# Patient Record
Sex: MALE | Race: BLACK OR AFRICAN AMERICAN | NOT HISPANIC OR LATINO
[De-identification: names, ages, dates, MRNs, and addresses within clinical notes are randomized per-mention and may not be internally consistent; named-entity substitution may affect disease eponyms.]

---

## 2020-06-12 PROBLEM — Z00.00 ENCOUNTER FOR PREVENTIVE HEALTH EXAMINATION: Status: ACTIVE | Noted: 2020-06-12

## 2020-06-18 ENCOUNTER — APPOINTMENT (OUTPATIENT)
Dept: UROLOGY | Facility: CLINIC | Age: 60
End: 2020-06-18

## 2020-06-18 ENCOUNTER — APPOINTMENT (OUTPATIENT)
Dept: UROLOGY | Facility: CLINIC | Age: 60
End: 2020-06-18
Payer: SELF-PAY

## 2020-06-18 PROCEDURE — 99203 OFFICE O/P NEW LOW 30 MIN: CPT | Mod: 95

## 2020-06-18 NOTE — HISTORY OF PRESENT ILLNESS
[Verbal consent obtained from patient] : the patient, [unfilled] [Home] : at home, [unfilled] , at the time of the visit. [Medical Office: ([facility])___] : at the medical office located in  [FreeTextEntry1] : pt to be contacted via [employer] [phone]\par \par The patient-doctor relationship has been established in a face to face fashion via real time video/audio HIPAA compliant communication using telemedicine software.  The patient's identity has been confirmed.  The patient was previously emailed a copy of the telemedicine consent.  They have had a chance to review and has now given verbal consent and has requested care to be assessed and treated through telemedicine and understands there maybe limitations in this process and they may need further follow up care in the office and or hospital settings.   \par  \par Verbal consent given on 6/2018\par  \par The patient denies fevers, chills, nausea and or vomiting and no unexplained weight loss. \par  \par All pertinent parts of the patient PFSH (past medical, family and social histories), laboratory, radiological studies and physician notes were reviewed prior to starting the face to face portion of the telemedicine visit.  Questionnaire results were discussed with patient. \par  \par CC: Prostate cancer \par \par HPI: Patient with GS7 prostate cancer on TRUS biopsy with Dr. Marks.  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  He has met with radiation oncologist and is deciding between radiation and surgery.  He already has decided that if he elects radiation he will refuse ADT. \par \par Mild LUTS, some urgency after biopsy.  Normal erections are reported. \par \par He is 5 foot 6 inches, 220 lbs.  His only comorbidity is HTN\par \par \par FAMHX:  Denies CAP \par SURGHX:  ESWL \par SOCIAL:  Nonsmoker, , 4 children and 5 grandchildren\par ROS: Negative 10 system\par \par

## 2020-06-18 NOTE — ASSESSMENT
[FreeTextEntry1] : Today we discussed the natural history of localized prostate cancer, and the heterogeneous biology of this malignancy.  We discussed the fact that many prostate cancers are slow growing and unlikely to metastasize or cause death, whereas others can be life threatening.  We reviewed risk stratification, staging, [place] scoring, and PSA levels as they pertain to risk.  \par \par All treatment options were reviewed.  This included active surveillance, androgen deprivation, emerging options such as focal therapy/HIFU/cryotherapy, radiation options (including IMRT, SBRT, brachytherapy) and surgical options (open vs. robotic surgery, nerve vs. non-nerve sparing).  Oncologic outcomes were compared and contrasted.  Risks of biochemical and clinical recurrence discussed.  Risks of needing adjuvant or salvage treatments reviewed.  We discussed the risks of secondary malignancy after radiation.  We discussed the opportunity for pathological staging with surgery vs. other options.  We discussed the possibility of positive margins, treatment failure, cancer recurrence and cancer-related death even with treatment. \par \par All potential side effects of treatment were reviewed including, but not limited to: short term or permanent stress urinary incontinence and/or short term or permanent erectile dysfunction, penile shortening, rectal symptoms/pain, perineal pain, and other side effects. \par \par All potential complications of treatment and surgery were reviewed including, but not limited to: risks of conversion from MIS to open surgery discussed, bleeding//life-threatening hemorrhage, rectal injury requiring colostomy or delayed recognition leading to fistula, vascular/bowel/adjacent visceral organ injury, trocar/access injury, the possibility of recognized vs. unrecognized/delayed-recognition injury, risks of thermal/blunt/sharp/retraction injury, risks of DVT, PE, MI, death, risks of cardiopulmonary/anesthesia related complications, positional injury, infection/collection/abscess, wound complications/dehiscence/seroma/cellulitis, urinoma/fistula, ureteral injury/obstruction, bladder neck contracture, penile shortening, meatal stenosis, urethral stricture, lymphocele, obturator nerve injury, prolonged anastomotic leak, and other complications. \par \par \par \par \par \par \par \par

## 2020-07-28 ENCOUNTER — OUTPATIENT (OUTPATIENT)
Dept: OUTPATIENT SERVICES | Facility: HOSPITAL | Age: 60
LOS: 1 days | End: 2020-07-28
Payer: COMMERCIAL

## 2020-07-28 ENCOUNTER — APPOINTMENT (OUTPATIENT)
Dept: UROLOGY | Facility: CLINIC | Age: 60
End: 2020-07-28
Payer: COMMERCIAL

## 2020-07-28 PROCEDURE — 99214 OFFICE O/P EST MOD 30 MIN: CPT

## 2020-07-28 PROCEDURE — 71045 X-RAY EXAM CHEST 1 VIEW: CPT

## 2020-07-28 PROCEDURE — 71045 X-RAY EXAM CHEST 1 VIEW: CPT | Mod: 26

## 2020-07-28 RX ORDER — AMLODIPINE BESYLATE 5 MG/1
5 TABLET ORAL
Refills: 0 | Status: ACTIVE | COMMUNITY

## 2020-07-28 RX ORDER — ASPIRIN 81 MG
81 TABLET, DELAYED RELEASE (ENTERIC COATED) ORAL
Refills: 0 | Status: ACTIVE | COMMUNITY

## 2020-07-28 RX ORDER — MULTIVITAMIN
TABLET ORAL
Refills: 0 | Status: ACTIVE | COMMUNITY

## 2020-07-28 NOTE — HISTORY OF PRESENT ILLNESS
[FreeTextEntry1] : CC: prostate cancer\par \par Patient ready for surgery on this coming Monday\par We had a lengthy discussion today reviewing, again, the various treatment options and all related r/b/a and complications.  This included, but was not limited to, long term or permanent ED/ROCIO. \par \par We are ensuring slides are reviewed internally, and pending that we will be ready to proceed next week. \par \par All questions answered.

## 2020-08-03 ENCOUNTER — OUTPATIENT (OUTPATIENT)
Dept: OUTPATIENT SERVICES | Facility: HOSPITAL | Age: 60
LOS: 1 days | End: 2020-08-03
Payer: COMMERCIAL

## 2020-08-03 ENCOUNTER — RESULT REVIEW (OUTPATIENT)
Age: 60
End: 2020-08-03

## 2020-08-03 DIAGNOSIS — C61 MALIGNANT NEOPLASM OF PROSTATE: ICD-10-CM

## 2020-08-03 LAB — SURGICAL PATHOLOGY STUDY: SIGNIFICANT CHANGE UP

## 2020-08-03 PROCEDURE — 88321 CONSLTJ&REPRT SLD PREP ELSWR: CPT

## 2020-08-05 ENCOUNTER — TRANSCRIPTION ENCOUNTER (OUTPATIENT)
Age: 60
End: 2020-08-05

## 2020-08-05 VITALS
WEIGHT: 216.93 LBS | OXYGEN SATURATION: 98 % | RESPIRATION RATE: 16 BRPM | HEART RATE: 76 BPM | SYSTOLIC BLOOD PRESSURE: 100 MMHG | DIASTOLIC BLOOD PRESSURE: 72 MMHG | HEIGHT: 66 IN | TEMPERATURE: 97 F

## 2020-08-06 ENCOUNTER — INPATIENT (INPATIENT)
Facility: HOSPITAL | Age: 60
LOS: 0 days | Discharge: HOME | DRG: 708 | End: 2020-08-07
Attending: UROLOGY | Admitting: UROLOGY
Payer: COMMERCIAL

## 2020-08-06 ENCOUNTER — APPOINTMENT (OUTPATIENT)
Dept: UROLOGY | Facility: HOSPITAL | Age: 60
End: 2020-08-06

## 2020-08-06 ENCOUNTER — RESULT REVIEW (OUTPATIENT)
Age: 60
End: 2020-08-06

## 2020-08-06 DIAGNOSIS — Z41.9 ENCOUNTER FOR PROCEDURE FOR PURPOSES OTHER THAN REMEDYING HEALTH STATE, UNSPECIFIED: Chronic | ICD-10-CM

## 2020-08-06 DIAGNOSIS — C61 MALIGNANT NEOPLASM OF PROSTATE: ICD-10-CM

## 2020-08-06 LAB
ANION GAP SERPL CALC-SCNC: 10 MMOL/L — SIGNIFICANT CHANGE UP (ref 5–17)
BUN SERPL-MCNC: 19 MG/DL — SIGNIFICANT CHANGE UP (ref 7–23)
CALCIUM SERPL-MCNC: 9.4 MG/DL — SIGNIFICANT CHANGE UP (ref 8.4–10.5)
CHLORIDE SERPL-SCNC: 103 MMOL/L — SIGNIFICANT CHANGE UP (ref 96–108)
CO2 SERPL-SCNC: 25 MMOL/L — SIGNIFICANT CHANGE UP (ref 22–31)
CREAT SERPL-MCNC: 1.64 MG/DL — HIGH (ref 0.5–1.3)
GLUCOSE SERPL-MCNC: 121 MG/DL — HIGH (ref 70–99)
HCT VFR BLD CALC: 40.5 % — SIGNIFICANT CHANGE UP (ref 39–50)
HGB BLD-MCNC: 12.4 G/DL — LOW (ref 13–17)
MCHC RBC-ENTMCNC: 27.3 PG — SIGNIFICANT CHANGE UP (ref 27–34)
MCHC RBC-ENTMCNC: 30.6 GM/DL — LOW (ref 32–36)
MCV RBC AUTO: 89 FL — SIGNIFICANT CHANGE UP (ref 80–100)
NRBC # BLD: 0 /100 WBCS — SIGNIFICANT CHANGE UP (ref 0–0)
PLATELET # BLD AUTO: 239 K/UL — SIGNIFICANT CHANGE UP (ref 150–400)
POTASSIUM SERPL-MCNC: 3.7 MMOL/L — SIGNIFICANT CHANGE UP (ref 3.5–5.3)
POTASSIUM SERPL-SCNC: 3.7 MMOL/L — SIGNIFICANT CHANGE UP (ref 3.5–5.3)
RBC # BLD: 4.55 M/UL — SIGNIFICANT CHANGE UP (ref 4.2–5.8)
RBC # FLD: 14.5 % — SIGNIFICANT CHANGE UP (ref 10.3–14.5)
SARS-COV-2 N GENE NPH QL NAA+PROBE: NOT DETECTED
SODIUM SERPL-SCNC: 138 MMOL/L — SIGNIFICANT CHANGE UP (ref 135–145)
WBC # BLD: 11.7 K/UL — HIGH (ref 3.8–10.5)
WBC # FLD AUTO: 11.7 K/UL — HIGH (ref 3.8–10.5)

## 2020-08-06 PROCEDURE — 88307 TISSUE EXAM BY PATHOLOGIST: CPT | Mod: 26

## 2020-08-06 PROCEDURE — 88309 TISSUE EXAM BY PATHOLOGIST: CPT | Mod: 26

## 2020-08-06 PROCEDURE — 38571 LAPAROSCOPY LYMPHADENECTOMY: CPT

## 2020-08-06 PROCEDURE — 55866 LAPS SURG PRST8ECT RPBIC RAD: CPT

## 2020-08-06 RX ORDER — ACETAMINOPHEN 500 MG
650 TABLET ORAL EVERY 6 HOURS
Refills: 0 | Status: DISCONTINUED | OUTPATIENT
Start: 2020-08-06 | End: 2020-08-07

## 2020-08-06 RX ORDER — NEBIVOLOL HYDROCHLORIDE 5 MG/1
1 TABLET ORAL
Qty: 0 | Refills: 0 | DISCHARGE

## 2020-08-06 RX ORDER — ENOXAPARIN SODIUM 100 MG/ML
40 INJECTION SUBCUTANEOUS ONCE
Refills: 0 | Status: COMPLETED | OUTPATIENT
Start: 2020-08-06 | End: 2020-08-06

## 2020-08-06 RX ORDER — ASPIRIN/CALCIUM CARB/MAGNESIUM 324 MG
0 TABLET ORAL
Qty: 0 | Refills: 0 | DISCHARGE

## 2020-08-06 RX ORDER — MORPHINE SULFATE 50 MG/1
4 CAPSULE, EXTENDED RELEASE ORAL ONCE
Refills: 0 | Status: DISCONTINUED | OUTPATIENT
Start: 2020-08-06 | End: 2020-08-06

## 2020-08-06 RX ORDER — LIDOCAINE 4 G/100G
1 CREAM TOPICAL
Refills: 0 | Status: DISCONTINUED | OUTPATIENT
Start: 2020-08-06 | End: 2020-08-07

## 2020-08-06 RX ORDER — CEFAZOLIN SODIUM 1 G
2000 VIAL (EA) INJECTION EVERY 8 HOURS
Refills: 0 | Status: DISCONTINUED | OUTPATIENT
Start: 2020-08-06 | End: 2020-08-07

## 2020-08-06 RX ORDER — SENNA PLUS 8.6 MG/1
2 TABLET ORAL AT BEDTIME
Refills: 0 | Status: DISCONTINUED | OUTPATIENT
Start: 2020-08-06 | End: 2020-08-07

## 2020-08-06 RX ORDER — ONDANSETRON 8 MG/1
4 TABLET, FILM COATED ORAL EVERY 6 HOURS
Refills: 0 | Status: DISCONTINUED | OUTPATIENT
Start: 2020-08-06 | End: 2020-08-07

## 2020-08-06 RX ORDER — OXYBUTYNIN CHLORIDE 5 MG
5 TABLET ORAL THREE TIMES A DAY
Refills: 0 | Status: DISCONTINUED | OUTPATIENT
Start: 2020-08-06 | End: 2020-08-07

## 2020-08-06 RX ORDER — METOCLOPRAMIDE HCL 10 MG
10 TABLET ORAL EVERY 6 HOURS
Refills: 0 | Status: DISCONTINUED | OUTPATIENT
Start: 2020-08-06 | End: 2020-08-07

## 2020-08-06 RX ORDER — AMLODIPINE BESYLATE 2.5 MG/1
10 TABLET ORAL DAILY
Refills: 0 | Status: DISCONTINUED | OUTPATIENT
Start: 2020-08-06 | End: 2020-08-07

## 2020-08-06 RX ORDER — HYDROCHLOROTHIAZIDE 25 MG
25 TABLET ORAL DAILY
Refills: 0 | Status: DISCONTINUED | OUTPATIENT
Start: 2020-08-06 | End: 2020-08-07

## 2020-08-06 RX ORDER — BUPIVACAINE 13.3 MG/ML
20 INJECTION, SUSPENSION, LIPOSOMAL INFILTRATION ONCE
Refills: 0 | Status: DISCONTINUED | OUTPATIENT
Start: 2020-08-06 | End: 2020-08-07

## 2020-08-06 RX ORDER — SODIUM CHLORIDE 9 MG/ML
1000 INJECTION INTRAMUSCULAR; INTRAVENOUS; SUBCUTANEOUS
Refills: 0 | Status: DISCONTINUED | OUTPATIENT
Start: 2020-08-06 | End: 2020-08-07

## 2020-08-06 RX ORDER — OLMESARTAN MEDOXOMIL / AMLODIPINE BESYLATE / HYDROCHLOROTHIAZIDE 40; 10; 25 MG/1; MG/1; MG/1
1 TABLET, FILM COATED ORAL
Qty: 0 | Refills: 0 | DISCHARGE

## 2020-08-06 RX ORDER — SODIUM CHLORIDE 9 MG/ML
1000 INJECTION, SOLUTION INTRAVENOUS
Refills: 0 | Status: DISCONTINUED | OUTPATIENT
Start: 2020-08-06 | End: 2020-08-06

## 2020-08-06 RX ORDER — GABAPENTIN 400 MG/1
600 CAPSULE ORAL ONCE
Refills: 0 | Status: COMPLETED | OUTPATIENT
Start: 2020-08-06 | End: 2020-08-06

## 2020-08-06 RX ORDER — ACETAMINOPHEN 500 MG
1000 TABLET ORAL ONCE
Refills: 0 | Status: COMPLETED | OUTPATIENT
Start: 2020-08-06 | End: 2020-08-06

## 2020-08-06 RX ORDER — LOSARTAN POTASSIUM 100 MG/1
100 TABLET, FILM COATED ORAL DAILY
Refills: 0 | Status: DISCONTINUED | OUTPATIENT
Start: 2020-08-06 | End: 2020-08-07

## 2020-08-06 RX ADMIN — Medication 100 MILLIGRAM(S): at 21:19

## 2020-08-06 RX ADMIN — Medication 650 MILLIGRAM(S): at 20:39

## 2020-08-06 RX ADMIN — GABAPENTIN 600 MILLIGRAM(S): 400 CAPSULE ORAL at 11:41

## 2020-08-06 RX ADMIN — Medication 650 MILLIGRAM(S): at 19:48

## 2020-08-06 RX ADMIN — SODIUM CHLORIDE 125 MILLILITER(S): 9 INJECTION, SOLUTION INTRAVENOUS at 19:56

## 2020-08-06 RX ADMIN — Medication 650 MILLIGRAM(S): at 23:24

## 2020-08-06 RX ADMIN — Medication 1000 MILLIGRAM(S): at 11:41

## 2020-08-06 RX ADMIN — LIDOCAINE 1 APPLICATION(S): 4 CREAM TOPICAL at 21:20

## 2020-08-06 RX ADMIN — ENOXAPARIN SODIUM 40 MILLIGRAM(S): 100 INJECTION SUBCUTANEOUS at 11:41

## 2020-08-06 RX ADMIN — SENNA PLUS 2 TABLET(S): 8.6 TABLET ORAL at 21:20

## 2020-08-06 RX ADMIN — MORPHINE SULFATE 4 MILLIGRAM(S): 50 CAPSULE, EXTENDED RELEASE ORAL at 20:20

## 2020-08-06 RX ADMIN — MORPHINE SULFATE 4 MILLIGRAM(S): 50 CAPSULE, EXTENDED RELEASE ORAL at 20:39

## 2020-08-06 RX ADMIN — Medication 5 MILLIGRAM(S): at 19:48

## 2020-08-06 NOTE — PROGRESS NOTE ADULT - PROBLEM SELECTOR PLAN 1
-OOB  -Incentive regulo  -Monitor I's and O's  -Letty and ZACHARY  -SCD's  -f/u am labs  -continue present care

## 2020-08-06 NOTE — PROGRESS NOTE ADULT - SUBJECTIVE AND OBJECTIVE BOX
UROLOGY POST OP NOTE (PAGER # [phone])    PROCEDURE: RALP    T(C): 36.9 (08-06-20 @ 22:00), Max: 36.9 (08-06-20 @ 22:00)  HR: 89 (08-06-20 @ 22:00) (78 - 89)  BP: 120/82 (08-06-20 @ 22:00) (117/75 - 137/88)  RR: 17 (08-06-20 @ 22:00) (14 - 18)  SpO2: 99% (08-06-20 @ 22:00) (95% - 99%)  Wt(kg): --    Subjective: Pt states pain 10/10. Given Morphine 4mg IVP X 1 dose in PACU. pain controlled. Denies N, V, CP, SOB.  ON PE: awake and alert    Abdomen: nontender, nondistended     : no suprapubic/CVAT. FC intact. ZACHARY draining serosanguinous fluid.                          12.4   11.70 )-----------( 239      ( 06 Aug 2020 19:10 )             40.5     08-06    138  |  103  |  19  ----------------------------<  121<H>  3.7   |  25  |  1.64<H>    Ca    9.4      06 Aug 2020 19:10        A/P: UROLOGY POST OP NOTE (PAGER # [phone])    PROCEDURE: RALP    T(C): 36.9 (08-06-20 @ 22:00), Max: 36.9 (08-06-20 @ 22:00)  HR: 89 (08-06-20 @ 22:00) (78 - 89)  BP: 120/82 (08-06-20 @ 22:00) (117/75 - 137/88)  RR: 17 (08-06-20 @ 22:00) (14 - 18)  SpO2: 99% (08-06-20 @ 22:00) (95% - 99%)  Wt(kg): --    Subjective: Pt states pain 10/10. Given Morphine 4mg IVP X 1 dose in PACU. pain controlled. Denies N, V, CP, SOB.  ON PE: awake and alert    Abdomen: nontender, nondistended     : no suprapubic/CVAT. FC intact.                           12.4   11.70 )-----------( 239      ( 06 Aug 2020 19:10 )             40.5     08-06    138  |  103  |  19  ----------------------------<  121<H>  3.7   |  25  |  1.64<H>    Ca    9.4      06 Aug 2020 19:10        A/P:

## 2020-08-06 NOTE — BRIEF OPERATIVE NOTE - OPERATION/FINDINGS
1. Robotic assisted prostatectomy with bilateral pelvic lymph node dissection  2. Watertight anastomosis  Dictation: [id number]

## 2020-08-07 ENCOUNTER — TRANSCRIPTION ENCOUNTER (OUTPATIENT)
Age: 60
End: 2020-08-07

## 2020-08-07 VITALS
DIASTOLIC BLOOD PRESSURE: 71 MMHG | SYSTOLIC BLOOD PRESSURE: 111 MMHG | TEMPERATURE: 99 F | RESPIRATION RATE: 18 BRPM | HEART RATE: 92 BPM | OXYGEN SATURATION: 95 %

## 2020-08-07 LAB
ANION GAP SERPL CALC-SCNC: 12 MMOL/L — SIGNIFICANT CHANGE UP (ref 5–17)
BASOPHILS # BLD AUTO: 0 K/UL — SIGNIFICANT CHANGE UP (ref 0–0.2)
BASOPHILS NFR BLD AUTO: 0 % — SIGNIFICANT CHANGE UP (ref 0–2)
BUN SERPL-MCNC: 21 MG/DL — SIGNIFICANT CHANGE UP (ref 7–23)
CALCIUM SERPL-MCNC: 8.6 MG/DL — SIGNIFICANT CHANGE UP (ref 8.4–10.5)
CHLORIDE SERPL-SCNC: 102 MMOL/L — SIGNIFICANT CHANGE UP (ref 96–108)
CO2 SERPL-SCNC: 22 MMOL/L — SIGNIFICANT CHANGE UP (ref 22–31)
CREAT SERPL-MCNC: 1.28 MG/DL — SIGNIFICANT CHANGE UP (ref 0.5–1.3)
EOSINOPHIL # BLD AUTO: 0 K/UL — SIGNIFICANT CHANGE UP (ref 0–0.5)
EOSINOPHIL NFR BLD AUTO: 0 % — SIGNIFICANT CHANGE UP (ref 0–6)
GLUCOSE SERPL-MCNC: 97 MG/DL — SIGNIFICANT CHANGE UP (ref 70–99)
HCT VFR BLD CALC: 34.3 % — LOW (ref 39–50)
HGB BLD-MCNC: 11 G/DL — LOW (ref 13–17)
LYMPHOCYTES # BLD AUTO: 1.15 K/UL — SIGNIFICANT CHANGE UP (ref 1–3.3)
LYMPHOCYTES # BLD AUTO: 13.9 % — SIGNIFICANT CHANGE UP (ref 13–44)
MAGNESIUM SERPL-MCNC: 1.9 MG/DL — SIGNIFICANT CHANGE UP (ref 1.6–2.6)
MCHC RBC-ENTMCNC: 27.6 PG — SIGNIFICANT CHANGE UP (ref 27–34)
MCHC RBC-ENTMCNC: 32.1 GM/DL — SIGNIFICANT CHANGE UP (ref 32–36)
MCV RBC AUTO: 86.2 FL — SIGNIFICANT CHANGE UP (ref 80–100)
MONOCYTES # BLD AUTO: 1.01 K/UL — HIGH (ref 0–0.9)
MONOCYTES NFR BLD AUTO: 12.2 % — SIGNIFICANT CHANGE UP (ref 2–14)
NEUTROPHILS # BLD AUTO: 5.96 K/UL — SIGNIFICANT CHANGE UP (ref 1.8–7.4)
NEUTROPHILS NFR BLD AUTO: 71.3 % — SIGNIFICANT CHANGE UP (ref 43–77)
PHOSPHATE SERPL-MCNC: 4.3 MG/DL — SIGNIFICANT CHANGE UP (ref 2.5–4.5)
PLATELET # BLD AUTO: 201 K/UL — SIGNIFICANT CHANGE UP (ref 150–400)
POTASSIUM SERPL-MCNC: 3.7 MMOL/L — SIGNIFICANT CHANGE UP (ref 3.5–5.3)
POTASSIUM SERPL-SCNC: 3.7 MMOL/L — SIGNIFICANT CHANGE UP (ref 3.5–5.3)
RBC # BLD: 3.98 M/UL — LOW (ref 4.2–5.8)
RBC # FLD: 14.6 % — HIGH (ref 10.3–14.5)
SODIUM SERPL-SCNC: 136 MMOL/L — SIGNIFICANT CHANGE UP (ref 135–145)
WBC # BLD: 8.26 K/UL — SIGNIFICANT CHANGE UP (ref 3.8–10.5)
WBC # FLD AUTO: 8.26 K/UL — SIGNIFICANT CHANGE UP (ref 3.8–10.5)

## 2020-08-07 RX ORDER — AMLODIPINE BESYLATE 2.5 MG/1
1 TABLET ORAL
Qty: 0 | Refills: 0 | DISCHARGE
Start: 2020-08-07

## 2020-08-07 RX ORDER — SENNA PLUS 8.6 MG/1
2 TABLET ORAL
Qty: 0 | Refills: 0 | DISCHARGE
Start: 2020-08-07

## 2020-08-07 RX ORDER — DOCUSATE SODIUM 100 MG
1 CAPSULE ORAL
Qty: 5 | Refills: 0
Start: 2020-08-07 | End: 2020-08-11

## 2020-08-07 RX ORDER — DIAZEPAM 5 MG
5 TABLET ORAL EVERY 8 HOURS
Refills: 0 | Status: DISCONTINUED | OUTPATIENT
Start: 2020-08-07 | End: 2020-08-07

## 2020-08-07 RX ORDER — TRAMADOL HYDROCHLORIDE 50 MG/1
50 TABLET ORAL EVERY 6 HOURS
Refills: 0 | Status: DISCONTINUED | OUTPATIENT
Start: 2020-08-07 | End: 2020-08-07

## 2020-08-07 RX ORDER — SODIUM CHLORIDE 9 MG/ML
1000 INJECTION, SOLUTION INTRAVENOUS
Refills: 0 | Status: DISCONTINUED | OUTPATIENT
Start: 2020-08-07 | End: 2020-08-07

## 2020-08-07 RX ORDER — SENNA PLUS 8.6 MG/1
2 TABLET ORAL
Qty: 10 | Refills: 0
Start: 2020-08-07 | End: 2020-08-11

## 2020-08-07 RX ORDER — LIDOCAINE 4 G/100G
1 CREAM TOPICAL
Qty: 0 | Refills: 0 | DISCHARGE
Start: 2020-08-07

## 2020-08-07 RX ORDER — TRAMADOL HYDROCHLORIDE 50 MG/1
25 TABLET ORAL EVERY 6 HOURS
Refills: 0 | Status: DISCONTINUED | OUTPATIENT
Start: 2020-08-07 | End: 2020-08-07

## 2020-08-07 RX ADMIN — AMLODIPINE BESYLATE 10 MILLIGRAM(S): 2.5 TABLET ORAL at 05:29

## 2020-08-07 RX ADMIN — TRAMADOL HYDROCHLORIDE 50 MILLIGRAM(S): 50 TABLET ORAL at 09:50

## 2020-08-07 RX ADMIN — Medication 5 MILLIGRAM(S): at 00:52

## 2020-08-07 RX ADMIN — Medication 100 MILLIGRAM(S): at 06:39

## 2020-08-07 RX ADMIN — Medication 650 MILLIGRAM(S): at 11:51

## 2020-08-07 RX ADMIN — Medication 25 MILLIGRAM(S): at 05:29

## 2020-08-07 RX ADMIN — Medication 650 MILLIGRAM(S): at 05:28

## 2020-08-07 RX ADMIN — SODIUM CHLORIDE 1000 MILLILITER(S): 9 INJECTION INTRAMUSCULAR; INTRAVENOUS; SUBCUTANEOUS at 00:03

## 2020-08-07 RX ADMIN — Medication 100 MILLIGRAM(S): at 13:43

## 2020-08-07 RX ADMIN — TRAMADOL HYDROCHLORIDE 50 MILLIGRAM(S): 50 TABLET ORAL at 08:49

## 2020-08-07 RX ADMIN — Medication 650 MILLIGRAM(S): at 00:24

## 2020-08-07 RX ADMIN — LOSARTAN POTASSIUM 100 MILLIGRAM(S): 100 TABLET, FILM COATED ORAL at 05:29

## 2020-08-07 NOTE — DISCHARGE NOTE PROVIDER - NSDCCPGOAL_GEN_ALL_CORE_FT
To get better and follow your care plan as instructed. To get better and follow your care plan as instructed. AMbulation, Hydration, and return to activity of daily living.

## 2020-08-07 NOTE — DISCHARGE NOTE NURSING/CASE MANAGEMENT/SOCIAL WORK - PATIENT PORTAL LINK FT
You can access the FollowMyHealth Patient Portal offered by [facility] by registering at the following website: [email or web address]. By joining [employer]s FollowMyHealth portal, you will also be able to view your health information using other applications (apps) compatible with our system.

## 2020-08-07 NOTE — DISCHARGE NOTE PROVIDER - NSDCCPCAREPLAN_GEN_ALL_CORE_FT
PRINCIPAL DISCHARGE DIAGNOSIS  Diagnosis: S/P prostatectomy  Assessment and Plan of Treatment: Advance diet as tolerated and activity as tolerated. Damon to leg bag. No heavy lifting or straining. Keep incisions clean, dry and intact. If fever >100.4 or any change or worsening of symptoms please call doctor or report to ED. Make follow up appointment with Dr. Cortes.  You may disconnect your damon catheter from the drainage bag, and let it drain freely while showering.  Make sure your incision sites are clean and dry after showering, it is not necessary to scrub, just let water and soap wash over incision sites.  Take Tylenol for Mild to Moderate pain, but Percocet for severe pain.  Hydrate well and continue to ambulate.   Please call Dr. Cortes for follow up appointment to remove damon catheter.

## 2020-08-07 NOTE — DISCHARGE NOTE PROVIDER - HOSPITAL COURSE
Patient is a 60y old  Male who presents with a chief complaint of         HPI: Patient is a 60M w/ CaP s/p RALP 8/6.  Patient is VSS, HDS, and afebrile.  Patient is ambulating and urine output inessa/yellow/clear.  Patient pain is controlled and passing flatus.  He was able to tolerate soft food.              Vital Signs Last 24 Hrs    T(C): 37.2 (07 Aug 2020 12:40), Max: 37.2 (07 Aug 2020 12:40)    T(F): 98.9 (07 Aug 2020 12:40), Max: 98.9 (07 Aug 2020 12:40)    HR: 92 (07 Aug 2020 12:40) (78 - 92)    BP: 111/71 (07 Aug 2020 12:40) (111/69 - 137/88)    BP(mean): 92 (06 Aug 2020 18:56) (91 - 95)    RR: 18 (07 Aug 2020 12:40) (14 - 18)    SpO2: 95% (07 Aug 2020 12:40) (93% - 99%)    I&O's Summary        06 Aug 2020 07:01  -  07 Aug 2020 07:00    --------------------------------------------------------    IN: 1780 mL / OUT: 600 mL / NET: 1180 mL        07 Aug 2020 07:01  -  07 Aug 2020 12:46    --------------------------------------------------------    IN: 0 mL / OUT: 1000 mL / NET: -1000 mL            LABS:                            11.0     8.26  )-----------( 201      ( 07 Aug 2020 08:03 )               34.3         08-07        136  |  102  |  21    ----------------------------<  97    3.7   |  22  |  1.28        Ca    8.6      07 Aug 2020 08:03    Phos  4.3     08-07    Mg     1.9     08-07 Patient is a 60y old  Male who presents with a chief complaint of         HPI: Patient is a 60M w/ CaP s/p RALP 8/6.  Patient is VSS, HDS, and afebrile.  Patient is ambulating and urine output inessa/yellow/clear.  Patient pain is controlled and passing flatus.  He was able to tolerate soft food.  Patient cleared for discharge to home w/ Saenz to leg bag.             Vital Signs Last 24 Hrs    T(C): 37.2 (07 Aug 2020 12:40), Max: 37.2 (07 Aug 2020 12:40)    T(F): 98.9 (07 Aug 2020 12:40), Max: 98.9 (07 Aug 2020 12:40)    HR: 92 (07 Aug 2020 12:40) (78 - 92)    BP: 111/71 (07 Aug 2020 12:40) (111/69 - 137/88)    BP(mean): 92 (06 Aug 2020 18:56) (91 - 95)    RR: 18 (07 Aug 2020 12:40) (14 - 18)    SpO2: 95% (07 Aug 2020 12:40) (93% - 99%)    I&O's Summary        06 Aug 2020 07:01  -  07 Aug 2020 07:00    --------------------------------------------------------    IN: 1780 mL / OUT: 600 mL / NET: 1180 mL        07 Aug 2020 07:01  -  07 Aug 2020 12:46    --------------------------------------------------------    IN: 0 mL / OUT: 1000 mL / NET: -1000 mL            LABS:                            11.0     8.26  )-----------( 201      ( 07 Aug 2020 08:03 )               34.3         08-07        136  |  102  |  21    ----------------------------<  97    3.7   |  22  |  1.28        Ca    8.6      07 Aug 2020 08:03    Phos  4.3     08-07    Mg     1.9     08-07

## 2020-08-07 NOTE — DISCHARGE NOTE PROVIDER - NSDCMRMEDTOKEN_GEN_ALL_CORE_FT
aspirin 81 mg oral tablet: orally once a day  Bystolic 5 mg oral tablet: 1 tab(s) orally once a day  Tribenzor 40 mg-5 mg-25 mg oral tablet: 1 tab(s) orally once a day amLODIPine 10 mg oral tablet: 1 tab(s) orally once a day  aspirin 81 mg oral tablet: orally once a day  Bystolic 5 mg oral tablet: 1 tab(s) orally once a day  Colace 100 mg oral capsule: 1 cap(s) orally once a day (at bedtime), As Needed -for constipation   hydroCHLOROthiazide 25 mg oral tablet: 1 tab(s) orally once a day  lidocaine 5% topical ointment: 1 application topically every 3 hours  oxycodone-acetaminophen 5 mg-325 mg oral tablet: 1 tab(s) orally every 8 hours, As Needed -for severe pain MDD:No more than 4 a day   senna oral tablet: 2 tab(s) orally once a day (at bedtime)  senna oral tablet: 2 tab(s) orally once a day (at bedtime), As Needed -for constipation   Tribenzor 40 mg-5 mg-25 mg oral tablet: 1 tab(s) orally once a day

## 2020-08-07 NOTE — DISCHARGE NOTE PROVIDER - NSDCFUADDINST_GEN_ALL_CORE_FT
You may disconnect your damon catheter from the drainage bag, and let it drain freely while showering.  Make sure your incision sites are clean and dry after showering, it is not necessary to scrub, just let water and soap wash over incision sites.    Blood in your urine. It's normal to see blood right after surgery. Urination might be painful, or you might have a sense of urgency or frequent need to urinate. This is normal. IF your catheter stops draining, call the office or go to the ER.

## 2020-08-07 NOTE — DISCHARGE NOTE PROVIDER - CARE PROVIDER_API CALL
Brooks Cortes  UROLOGY  [address] [address]  Phone: [phone]  Fax: [phone]  Follow Up Time: 1 week

## 2020-08-10 PROBLEM — D49.9 NEOPLASM OF UNSPECIFIED BEHAVIOR OF UNSPECIFIED SITE: Chronic | Status: ACTIVE | Noted: 2020-08-05

## 2020-08-10 PROBLEM — I10 ESSENTIAL (PRIMARY) HYPERTENSION: Chronic | Status: ACTIVE | Noted: 2020-08-05

## 2020-08-10 PROCEDURE — 80048 BASIC METABOLIC PNL TOTAL CA: CPT

## 2020-08-10 PROCEDURE — 83735 ASSAY OF MAGNESIUM: CPT

## 2020-08-10 PROCEDURE — 88307 TISSUE EXAM BY PATHOLOGIST: CPT

## 2020-08-10 PROCEDURE — 36415 COLL VENOUS BLD VENIPUNCTURE: CPT

## 2020-08-10 PROCEDURE — 86850 RBC ANTIBODY SCREEN: CPT

## 2020-08-10 PROCEDURE — 84100 ASSAY OF PHOSPHORUS: CPT

## 2020-08-10 PROCEDURE — 85025 COMPLETE CBC W/AUTO DIFF WBC: CPT

## 2020-08-10 PROCEDURE — 85027 COMPLETE CBC AUTOMATED: CPT

## 2020-08-10 PROCEDURE — S2900: CPT

## 2020-08-10 PROCEDURE — 86901 BLOOD TYPING SEROLOGIC RH(D): CPT

## 2020-08-10 PROCEDURE — 88309 TISSUE EXAM BY PATHOLOGIST: CPT

## 2020-08-11 LAB — SURGICAL PATHOLOGY STUDY: SIGNIFICANT CHANGE UP

## 2020-08-11 RX ORDER — OXYCODONE AND ACETAMINOPHEN 5; 325 MG/1; MG/1
5-325 TABLET ORAL EVERY 8 HOURS
Qty: 6 | Refills: 0 | Status: ACTIVE | COMMUNITY
Start: 2020-08-11 | End: 1900-01-01

## 2020-08-11 RX ORDER — CIPROFLOXACIN HYDROCHLORIDE 500 MG/1
500 TABLET, FILM COATED ORAL
Qty: 6 | Refills: 0 | Status: ACTIVE | COMMUNITY
Start: 2020-08-11 | End: 1900-01-01

## 2020-08-12 DIAGNOSIS — I10 ESSENTIAL (PRIMARY) HYPERTENSION: ICD-10-CM

## 2020-08-12 DIAGNOSIS — C61 MALIGNANT NEOPLASM OF PROSTATE: ICD-10-CM

## 2020-08-13 ENCOUNTER — APPOINTMENT (OUTPATIENT)
Dept: UROLOGY | Facility: CLINIC | Age: 60
End: 2020-08-13
Payer: COMMERCIAL

## 2020-08-13 VITALS
BODY MASS INDEX: 34.87 KG/M2 | DIASTOLIC BLOOD PRESSURE: 82 MMHG | SYSTOLIC BLOOD PRESSURE: 138 MMHG | HEART RATE: 85 BPM | WEIGHT: 217 LBS | TEMPERATURE: 97.9 F | HEIGHT: 66 IN

## 2020-08-13 PROCEDURE — 99024 POSTOP FOLLOW-UP VISIT: CPT

## 2020-08-13 NOTE — HISTORY OF PRESENT ILLNESS
[FreeTextEntry1] : This is a 60 year old male who underwent RALP on 8/6/20.\par Doing well since surgery\par Passing flatus, +BM, denies any nausea/vomiting, tolerating diet\par No hematuria\par \par pathology= GS 7 (3+4), pT2, N0\par \par

## 2020-08-13 NOTE — PHYSICAL EXAM
[General Appearance - Well Nourished] : well nourished [General Appearance - Well Developed] : well developed [Normal Appearance] : normal appearance [Well Groomed] : well groomed [General Appearance - In No Acute Distress] : no acute distress [Abdomen Soft] : soft [Abdomen Tenderness] : non-tender [Costovertebral Angle Tenderness] : no ~M costovertebral angle tenderness [Edema] : no peripheral edema [] : no respiratory distress [Exaggerated Use Of Accessory Muscles For Inspiration] : no accessory muscle use [Respiration, Rhythm And Depth] : normal respiratory rhythm and effort [Oriented To Time, Place, And Person] : oriented to person, place, and time [Affect] : the affect was normal [Not Anxious] : not anxious [Mood] : the mood was normal

## 2020-08-13 NOTE — ASSESSMENT
[FreeTextEntry1] : 60 year old male POD #7 from RALP\par -catheter removed today intact and without any difficulty\par -pathology reviewed with patient\par -Kegel exercises discussed\par -PSA and follow up with Dr. Cortes in 6-8 weeks

## 2020-10-06 ENCOUNTER — APPOINTMENT (OUTPATIENT)
Dept: UROLOGY | Facility: CLINIC | Age: 60
End: 2020-10-06
Payer: COMMERCIAL

## 2020-10-06 ENCOUNTER — APPOINTMENT (OUTPATIENT)
Dept: UROLOGY | Facility: CLINIC | Age: 60
End: 2020-10-06

## 2020-10-06 PROCEDURE — 99024 POSTOP FOLLOW-UP VISIT: CPT

## 2020-10-06 NOTE — HISTORY OF PRESENT ILLNESS
[Urinary Incontinence] : urinary incontinence [FreeTextEntry1] : CC: CaP\par \par This is a 60 year old male who underwent RALP on 8/6/20.\par Doing well since surgery\par Has not been doing Kegel's as planned\par \par pathology= GS 7 (3+4), pT2, N0, R0 resection + PNI\par PSA today 0.14 ng/mL at [facility] [Urinary Retention] : no urinary retention [Urinary Urgency] : no urinary urgency [Urinary Frequency] : no urinary frequency

## 2020-10-06 NOTE — ASSESSMENT
[FreeTextEntry1] : 60 year old male s/p RALP 8/26\par - PSA today 0.14 ng/mL\par - Pelvic floor PT and Kegels\par - RTC 6-8 weeks with another PSA\par - Possible need for adjuvant XRT\par - Daily tadalafil for penile rehab

## 2020-10-06 NOTE — PHYSICAL EXAM
[General Appearance - Well Developed] : well developed [General Appearance - Well Nourished] : well nourished [Bowel Sounds] : normal bowel sounds [Skin Color & Pigmentation] : normal skin color and pigmentation [Skin Turgor] : supple [Heart Rate And Rhythm] : Heart rate and rhythm were normal [] : no respiratory distress [Respiration, Rhythm And Depth] : normal respiratory rhythm and effort [Oriented To Time, Place, And Person] : oriented to person, place, and time [Not Anxious] : not anxious [Normal Station and Gait] : the gait and station were normal for the patient's age [No Focal Deficits] : no focal deficits [FreeTextEntry1] : +leak on cough and stream on valsalva

## 2020-11-20 ENCOUNTER — NON-APPOINTMENT (OUTPATIENT)
Age: 60
End: 2020-11-20

## 2020-12-10 ENCOUNTER — APPOINTMENT (OUTPATIENT)
Dept: UROLOGY | Facility: CLINIC | Age: 60
End: 2020-12-10
Payer: COMMERCIAL

## 2020-12-10 VITALS
DIASTOLIC BLOOD PRESSURE: 88 MMHG | SYSTOLIC BLOOD PRESSURE: 131 MMHG | OXYGEN SATURATION: 98 % | HEART RATE: 88 BPM | TEMPERATURE: 98.8 F

## 2020-12-10 PROCEDURE — 99214 OFFICE O/P EST MOD 30 MIN: CPT

## 2020-12-10 PROCEDURE — 99072 ADDL SUPL MATRL&STAF TM PHE: CPT

## 2020-12-10 NOTE — ASSESSMENT
[FreeTextEntry1] : Prostate cancer (185) (C61)\par \par 60 year old male s/p RALP 8/26\par - PSA today\par - Pelvic floor PT and Kegels\par - RTC 3 months\par - Possible need for adjuvant XRT\par - Daily tadalafil for penile rehab, adding 20 mg daily cialis

## 2020-12-10 NOTE — PHYSICAL EXAM
[General Appearance - Well Developed] : well developed [General Appearance - Well Nourished] : well nourished [Bowel Sounds] : normal bowel sounds [Abdomen Soft] : soft [Skin Color & Pigmentation] : normal skin color and pigmentation [Skin Turgor] : supple [Heart Rate And Rhythm] : Heart rate and rhythm were normal [] : no respiratory distress [Respiration, Rhythm And Depth] : normal respiratory rhythm and effort [Oriented To Time, Place, And Person] : oriented to person, place, and time [Not Anxious] : not anxious [Normal Station and Gait] : the gait and station were normal for the patient's age [No Focal Deficits] : no focal deficits [FreeTextEntry1] : + stream on valsalva, dry on cough

## 2020-12-10 NOTE — HISTORY OF PRESENT ILLNESS
[Urinary Incontinence] : urinary incontinence [FreeTextEntry1] : CC: CaP\par \par This is a 60 year old male who underwent RALP on 8/6/20.\par Doing well since surgery, still having persistent urinary leakage, 2 pads per day PPSUI\par Only doing ~10 kegels a day but increased to 5-6 days a week, has not been doing Kegel's as planned\par Beginning to note early erections with 5 mg QD cialis\par \par pathology= GS 7 (3+4), pT2, N0, R0 resection + PNI\par PSA today 0.14 ng/mL at [facility] \par \par  [Urinary Retention] : no urinary retention [Urinary Urgency] : no urinary urgency

## 2020-12-21 RX ORDER — TADALAFIL 5 MG/1
5 TABLET ORAL
Qty: 30 | Refills: 6 | Status: ACTIVE | COMMUNITY
Start: 2020-10-06 | End: 1900-01-01

## 2020-12-21 RX ORDER — TADALAFIL 20 MG/1
20 TABLET ORAL
Qty: 6 | Refills: 3 | Status: ACTIVE | COMMUNITY
Start: 2020-12-21 | End: 1900-01-01

## 2020-12-30 LAB — PSA, POST - PROSTATECTOMY: 0.18 NG/ML

## 2021-03-16 ENCOUNTER — APPOINTMENT (OUTPATIENT)
Dept: UROLOGY | Facility: CLINIC | Age: 61
End: 2021-03-16
Payer: COMMERCIAL

## 2021-03-16 VITALS — TEMPERATURE: 97.6 F | DIASTOLIC BLOOD PRESSURE: 80 MMHG | HEART RATE: 90 BPM | SYSTOLIC BLOOD PRESSURE: 126 MMHG

## 2021-03-16 DIAGNOSIS — C61 MALIGNANT NEOPLASM OF PROSTATE: ICD-10-CM

## 2021-03-16 PROCEDURE — 99213 OFFICE O/P EST LOW 20 MIN: CPT

## 2021-03-16 PROCEDURE — 99072 ADDL SUPL MATRL&STAF TM PHE: CPT

## 2021-03-16 RX ORDER — OXYBUTYNIN CHLORIDE 10 MG/1
10 TABLET, EXTENDED RELEASE ORAL DAILY
Qty: 90 | Refills: 1 | Status: ACTIVE | COMMUNITY
Start: 2021-03-16 | End: 1900-01-01

## 2021-03-16 NOTE — HISTORY OF PRESENT ILLNESS
[Urinary Incontinence] : urinary incontinence [Erectile Dysfunction] : Erectile Dysfunction [None] : None [FreeTextEntry1] : CC: CaP - S/P RALP 08/20 - 7 months post-op\par \par pathology= GS 7 (3+4), pT2N0, R0 resection + PNI, negative margins\par \par PSA 12/20 - 0.13 ng/mL at [facility] \par PSA 12/20 - 0.18 ng/mL ([facility])\par PSA 03/21 - 0.2 something - reported by patient\par \par \par Continence - kegels prescribed - currently doing daily but not doing as much as he would like; some improvement since December; 3-5 ppd; not soaked just uncomfortable\par -no ROCIO today shown with cough; no UUI at all\par \par ED - 5mg daily cialis - using everyday; some improvement - can increase to 10mg daily\par \par \par \par \par  [Urinary Retention] : no urinary retention [Urinary Urgency] : no urinary urgency [Urinary Frequency] : no urinary frequency [Nocturia] : no nocturia [Dysuria] : no dysuria [Hematuria - Gross] : no gross hematuria

## 2021-03-16 NOTE — HISTORY OF PRESENT ILLNESS
[Urinary Incontinence] : urinary incontinence [Erectile Dysfunction] : Erectile Dysfunction [None] : None [FreeTextEntry1] : CC: CaP - S/P RALP 08/20 - 7 months post-op\par \par pathology= GS 7 (3+4), pT2N0, R0 resection + PNI, negative margins\par \par PSA 12/20 - 0.13 ng/mL at [facility] \par PSA 12/20 - 0.18 ng/mL ([facility])\par PSA 03/21 - 0.2 something - reported by patient\par \par \par Continence - kegels prescribed - currently doing daily but not doing as much as he would like; some improvement since December; 3-5 ppd; not soaked just uncomfortable\par -no ROCIO today shown with cough; no UUI at all\par \par ED - 5mg daily cialis - using everyday; some improvement - can increase to 10mg daily\par \par \par \par \par  [Urinary Retention] : no urinary retention [Urinary Urgency] : no urinary urgency [Urinary Frequency] : no urinary frequency [Nocturia] : no nocturia [Dysuria] : no dysuria [Hematuria - Gross] : no gross hematuria

## 2021-03-16 NOTE — ASSESSMENT
[FreeTextEntry1] : 59 yo male - 7 months post RALP\par \par path= GS 7 (3+4), pT2N0, R0 resection + PNI, negative margins\par \par PSA detectable - 0.2 according to patient, previously 0.18 at 4 months\par Healing well\par \par ED - improving; 5mg cialis will increase to 10mg\par UI - some ROCIO; no UUI - minimal; not demonstrated today although uses 3ppd\par \par Plan - \par 1. Kegels\par 2. PSA today (trend at [facility])\par 3. Follow-up 3 months w/ PSA \par 4. Doing a PET scan at the Cancer Center ([facility])\par 5. oxybutynin for urgency

## 2021-03-16 NOTE — ASSESSMENT
[FreeTextEntry1] : 59 yo male - 7 months post RALP\par \par path= GS 7 (3+4), pT2N0, R0 resection + PNI, negative margins\par \par PSA detectable - 0.2 according to patient, previously 0.18 at 4 months\par Healing well\par \par ED - improving; 5mg cialis will increase to 10mg\par UI - some ROCIO; no UUI - minimal; not demonstrated today although uses 3ppd\par \par Plan - \par 1. Kegels\par 2. PSA today (trend at [facility])\par 3. Follow-up 3 months w/ PSA \par 4. Doing a PET scan at the Cancer Center ([facility])\par 5. oxybutynin for urgency

## 2021-03-16 NOTE — PHYSICAL EXAM
[General Appearance - Well Developed] : well developed [General Appearance - Well Nourished] : well nourished [Bowel Sounds] : normal bowel sounds [Abdomen Soft] : soft [FreeTextEntry1] : + stream on valsalva, dry on cough [Skin Color & Pigmentation] : normal skin color and pigmentation [Skin Turgor] : supple [Heart Rate And Rhythm] : Heart rate and rhythm were normal [] : no respiratory distress [Respiration, Rhythm And Depth] : normal respiratory rhythm and effort [Oriented To Time, Place, And Person] : oriented to person, place, and time [Not Anxious] : not anxious [Normal Station and Gait] : the gait and station were normal for the patient's age [No Focal Deficits] : no focal deficits

## 2021-03-19 LAB — PSA, POST - PROSTATECTOMY: 0.24 NG/ML

## 2021-10-08 NOTE — PRE-OP CHECKLIST - HEIGHT IN INCHES
6 Elliptical Excision Additional Text (Leave Blank If You Do Not Want): The margin was drawn around the clinically apparent lesion.  An elliptical shape was then drawn on the skin incorporating the lesion and margins.  Incisions were then made along these lines to the appropriate tissue plane and the lesion was extirpated.

## 2024-05-24 NOTE — DISCHARGE NOTE NURSING/CASE MANAGEMENT/SOCIAL WORK - NSPROEXTENSIONSOFSELF_GEN_A_NUR
Is the patient currently in the state of [place]? YES    Visit mode:TELEPHONE    If the visit is dropped, the patient can be reconnected by: TELEPHONE VISIT: Phone number: [phone]    Will anyone else be joining the visit? NO  (If patient encounters technical issues they should call [phone]    How would you like to obtain your AVS? MyChart    Are changes needed to the allergy or medication list? No    Are refills needed on medications prescribed by this physician? NO    Reason for visit: MARIUM MERCADO     
none

## 2024-10-17 NOTE — DISCHARGE NOTE PROVIDER - NSDCADMDATE_GEN_ALL_CORE_FT
Pt's mom verbalized understanding of discharge instructions, all questions answered. Pt assisted in dressing. NAD   06-Aug-2020 09:11